# Patient Record
Sex: MALE | Race: OTHER
[De-identification: names, ages, dates, MRNs, and addresses within clinical notes are randomized per-mention and may not be internally consistent; named-entity substitution may affect disease eponyms.]

---

## 2021-12-27 ENCOUNTER — HOSPITAL ENCOUNTER (EMERGENCY)
Dept: HOSPITAL 54 - ER | Age: 26
Discharge: HOME | End: 2021-12-27
Payer: MEDICAID

## 2021-12-27 VITALS — DIASTOLIC BLOOD PRESSURE: 75 MMHG | SYSTOLIC BLOOD PRESSURE: 127 MMHG

## 2021-12-27 VITALS — WEIGHT: 150 LBS | HEIGHT: 66 IN | BODY MASS INDEX: 24.11 KG/M2
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
## 2021-12-27 DIAGNOSIS — M54.50: Primary | ICD-10-CM

## 2021-12-27 DIAGNOSIS — R11.2: ICD-10-CM
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
## 2021-12-27 NOTE — NUR
BIBSELF C/O LOWER BACK PAIN P/S 10/10 S/P PLAYING BASKETBALL 12/22/21, TOOK 
TRAMADOL THAT RELIEVED PAIN THEN STARTED VOMITING. RESPIRATION REGULAR AND 
UNLABORED. WILL CONTINUE TO MONITOR THE PATIENT.